# Patient Record
Sex: FEMALE | Race: WHITE | NOT HISPANIC OR LATINO | URBAN - METROPOLITAN AREA
[De-identification: names, ages, dates, MRNs, and addresses within clinical notes are randomized per-mention and may not be internally consistent; named-entity substitution may affect disease eponyms.]

---

## 2017-03-18 ENCOUNTER — INPATIENT (INPATIENT)
Facility: HOSPITAL | Age: 82
LOS: 1 days | End: 2017-03-20
Attending: INTERNAL MEDICINE | Admitting: INTERNAL MEDICINE
Payer: MEDICARE

## 2017-03-18 VITALS
SYSTOLIC BLOOD PRESSURE: 98 MMHG | TEMPERATURE: 208 F | HEIGHT: 62 IN | HEART RATE: 96 BPM | OXYGEN SATURATION: 96 % | DIASTOLIC BLOOD PRESSURE: 60 MMHG | RESPIRATION RATE: 17 BRPM | WEIGHT: 134.04 LBS

## 2017-03-18 DIAGNOSIS — S42.401A UNSPECIFIED FRACTURE OF LOWER END OF RIGHT HUMERUS, INITIAL ENCOUNTER FOR CLOSED FRACTURE: Chronic | ICD-10-CM

## 2017-03-18 DIAGNOSIS — Z96.652 PRESENCE OF LEFT ARTIFICIAL KNEE JOINT: Chronic | ICD-10-CM

## 2017-03-18 LAB
ALBUMIN SERPL ELPH-MCNC: 3.4 G/DL — SIGNIFICANT CHANGE UP (ref 3.3–5)
ALP SERPL-CCNC: 97 U/L — SIGNIFICANT CHANGE UP (ref 40–120)
ALT FLD-CCNC: 40 U/L — SIGNIFICANT CHANGE UP (ref 12–78)
ANION GAP SERPL CALC-SCNC: 9 MMOL/L — SIGNIFICANT CHANGE UP (ref 5–17)
APTT BLD: 22.7 SEC — LOW (ref 27.5–37.4)
AST SERPL-CCNC: 73 U/L — HIGH (ref 15–37)
BASOPHILS # BLD AUTO: 0.1 K/UL — SIGNIFICANT CHANGE UP (ref 0–0.2)
BASOPHILS NFR BLD AUTO: 0.4 % — SIGNIFICANT CHANGE UP (ref 0–2)
BILIRUB SERPL-MCNC: 1.6 MG/DL — HIGH (ref 0.2–1.2)
BUN SERPL-MCNC: 18 MG/DL — SIGNIFICANT CHANGE UP (ref 7–23)
CALCIUM SERPL-MCNC: 8.7 MG/DL — SIGNIFICANT CHANGE UP (ref 8.5–10.1)
CHLORIDE SERPL-SCNC: 103 MMOL/L — SIGNIFICANT CHANGE UP (ref 96–108)
CK MB BLD-MCNC: 1.3 % — SIGNIFICANT CHANGE UP (ref 0–3.5)
CK MB CFR SERPL CALC: 2.5 NG/ML — SIGNIFICANT CHANGE UP (ref 0.5–3.6)
CK SERPL-CCNC: 196 U/L — HIGH (ref 26–192)
CO2 SERPL-SCNC: 27 MMOL/L — SIGNIFICANT CHANGE UP (ref 22–31)
CREAT SERPL-MCNC: 1.2 MG/DL — SIGNIFICANT CHANGE UP (ref 0.5–1.3)
EOSINOPHIL # BLD AUTO: 0 K/UL — SIGNIFICANT CHANGE UP (ref 0–0.5)
EOSINOPHIL NFR BLD AUTO: 0.2 % — SIGNIFICANT CHANGE UP (ref 0–6)
GLUCOSE SERPL-MCNC: 149 MG/DL — HIGH (ref 70–99)
HCT VFR BLD CALC: 46.1 % — HIGH (ref 34.5–45)
HGB BLD-MCNC: 16.1 G/DL — HIGH (ref 11.5–15.5)
INR BLD: 1.09 RATIO — SIGNIFICANT CHANGE UP (ref 0.88–1.16)
LACTATE SERPL-SCNC: 3.4 MMOL/L — HIGH (ref 0.7–2)
LYMPHOCYTES # BLD AUTO: 0.4 K/UL — LOW (ref 1–3.3)
LYMPHOCYTES # BLD AUTO: 3.2 % — LOW (ref 13–44)
MCHC RBC-ENTMCNC: 30.2 PG — SIGNIFICANT CHANGE UP (ref 27–34)
MCHC RBC-ENTMCNC: 35 GM/DL — SIGNIFICANT CHANGE UP (ref 32–36)
MCV RBC AUTO: 86.5 FL — SIGNIFICANT CHANGE UP (ref 80–100)
MONOCYTES # BLD AUTO: 0.1 K/UL — SIGNIFICANT CHANGE UP (ref 0–0.9)
MONOCYTES NFR BLD AUTO: 0.6 % — LOW (ref 2–14)
NEUTROPHILS # BLD AUTO: 12.6 K/UL — HIGH (ref 1.8–7.4)
NEUTROPHILS NFR BLD AUTO: 95.6 % — HIGH (ref 43–77)
PLATELET # BLD AUTO: 330 K/UL — SIGNIFICANT CHANGE UP (ref 150–400)
POTASSIUM SERPL-MCNC: 5.2 MMOL/L — SIGNIFICANT CHANGE UP (ref 3.5–5.3)
POTASSIUM SERPL-SCNC: 5.2 MMOL/L — SIGNIFICANT CHANGE UP (ref 3.5–5.3)
PROT SERPL-MCNC: 7.2 GM/DL — SIGNIFICANT CHANGE UP (ref 6–8.3)
PROTHROM AB SERPL-ACNC: 12.2 SEC — SIGNIFICANT CHANGE UP (ref 10–13.1)
RBC # BLD: 5.33 M/UL — HIGH (ref 3.8–5.2)
RBC # FLD: 15.7 % — HIGH (ref 11–15)
SODIUM SERPL-SCNC: 139 MMOL/L — SIGNIFICANT CHANGE UP (ref 135–145)
WBC # BLD: 13.2 K/UL — HIGH (ref 3.8–10.5)
WBC # FLD AUTO: 13.2 K/UL — HIGH (ref 3.8–10.5)

## 2017-03-18 PROCEDURE — 99285 EMERGENCY DEPT VISIT HI MDM: CPT

## 2017-03-18 RX ORDER — SODIUM CHLORIDE 9 MG/ML
1000 INJECTION INTRAMUSCULAR; INTRAVENOUS; SUBCUTANEOUS ONCE
Qty: 0 | Refills: 0 | Status: COMPLETED | OUTPATIENT
Start: 2017-03-18 | End: 2017-03-18

## 2017-03-18 RX ORDER — ONDANSETRON 8 MG/1
4 TABLET, FILM COATED ORAL ONCE
Qty: 0 | Refills: 0 | Status: COMPLETED | OUTPATIENT
Start: 2017-03-18 | End: 2017-03-18

## 2017-03-18 RX ORDER — MORPHINE SULFATE 50 MG/1
2 CAPSULE, EXTENDED RELEASE ORAL ONCE
Qty: 0 | Refills: 0 | Status: DISCONTINUED | OUTPATIENT
Start: 2017-03-18 | End: 2017-03-18

## 2017-03-18 RX ORDER — PIPERACILLIN AND TAZOBACTAM 4; .5 G/20ML; G/20ML
3.38 INJECTION, POWDER, LYOPHILIZED, FOR SOLUTION INTRAVENOUS ONCE
Qty: 0 | Refills: 0 | Status: COMPLETED | OUTPATIENT
Start: 2017-03-18 | End: 2017-03-18

## 2017-03-18 RX ADMIN — PIPERACILLIN AND TAZOBACTAM 200 GRAM(S): 4; .5 INJECTION, POWDER, LYOPHILIZED, FOR SOLUTION INTRAVENOUS at 23:39

## 2017-03-18 RX ADMIN — MORPHINE SULFATE 2 MILLIGRAM(S): 50 CAPSULE, EXTENDED RELEASE ORAL at 23:18

## 2017-03-18 RX ADMIN — ONDANSETRON 4 MILLIGRAM(S): 8 TABLET, FILM COATED ORAL at 23:28

## 2017-03-18 RX ADMIN — SODIUM CHLORIDE 500 MILLILITER(S): 9 INJECTION INTRAMUSCULAR; INTRAVENOUS; SUBCUTANEOUS at 23:28

## 2017-03-18 RX ADMIN — MORPHINE SULFATE 2 MILLIGRAM(S): 50 CAPSULE, EXTENDED RELEASE ORAL at 23:39

## 2017-03-18 NOTE — ED ADULT NURSE NOTE - PMH
Anxiety    Cold agglutinin disease    Colitis  microscopic  diagnosed colonoscopy   Depression    DVT (Deep Venous Thrombosis)  left leg 2001 last coumadin 2009  Fracture  right proximal ulna and right radial head  Glaucoma  s/p laser last treated 2001  Hemolytic Anemia  diagnosed 2001 transfused 3 years ago  Joint Replacement  right hip with hardware s/p fracture  Olecranon fracture  left -4/8/16  Osteopenia    Overactive Bladder    Raynauds Phenomenon    Varicosities  b/l legs  Vasculitis  lungs

## 2017-03-18 NOTE — ED ADULT NURSE NOTE - OBJECTIVE STATEMENT
pt is AxOx4; c/o abd pain which started in the middle of the day. c/o n/v x multiple episodes. pt feels dizzy and weak

## 2017-03-18 NOTE — ED ADULT TRIAGE NOTE - CHIEF COMPLAINT QUOTE
" Around 2 o'clock I started to have pain around my stomach and I have been throwing since, and I feel very dizzy"

## 2017-03-18 NOTE — ED PROVIDER NOTE - CARE PLAN
Principal Discharge DX:	NSTEMI (non-ST elevated myocardial infarction)  Secondary Diagnosis:	Ureteral colic Principal Discharge DX:	NSTEMI (non-ST elevated myocardial infarction)  Secondary Diagnosis:	Ureteral colic  Secondary Diagnosis:	Sepsis

## 2017-03-18 NOTE — ED PROVIDER NOTE - PHYSICAL EXAMINATION
Gen: Alert, +rigors;  Head: NC, AT, EOMI, normal lids/conjunctiva;  ENT: normal hearing, patent oropharynx, MMM;  Neck: supple, no tenderness/meningismus, FROM;  Pulm: Bilateral clear BS, normal resp effort, no wheeze/stridor/retractions;  CV: RRR, no M/R/G, +dist pulses;  Abd: soft, epigastric & RUQ TTP, ND, +BS, +voluntary guarding;  Mskel: no edema/erythema/cyanosis;  Skin: no rash;  Neuro: AAOx3, no sensory/motor deficits

## 2017-03-18 NOTE — ED PROVIDER NOTE - OBJECTIVE STATEMENT
Pertinent PMH/PSH/FHx/SHx and Review of Systems contained within:    82yo F w PMH of cold agglutinin hemolytic anemia, vasculitis, anxiety, Raynaud's, depression, DVT, glaucoma, s/p hip replacement & partial knee replacement presents to ED for eval of abd pain & vomiting.  Pt states she was in her usual state of health until about 2pm, after lunch of fruits, cottage cheese & yogurt, when she felt mid to upper abd pain, radiating to R flank.  Pt then had mult episode of NB/NB vomiting.  Denies diarrhea, ingestion of contaminated foods.  Pt also c/o fever, chills & weakness.    +fever/chills, No photophobia/eye pain/changes in vision, No ear pain/sore throat/dysphagia, No chest pain/palpitations, no SOB/cough/wheeze/stridor, +abdominal pain, No neck/back pain, no rash, no changes in neurological status/function.

## 2017-03-19 ENCOUNTER — TRANSCRIPTION ENCOUNTER (OUTPATIENT)
Age: 82
End: 2017-03-19

## 2017-03-19 DIAGNOSIS — N23 UNSPECIFIED RENAL COLIC: ICD-10-CM

## 2017-03-19 DIAGNOSIS — A41.9 SEPSIS, UNSPECIFIED ORGANISM: ICD-10-CM

## 2017-03-19 DIAGNOSIS — I21.4 NON-ST ELEVATION (NSTEMI) MYOCARDIAL INFARCTION: ICD-10-CM

## 2017-03-19 DIAGNOSIS — N13.2 HYDRONEPHROSIS WITH RENAL AND URETERAL CALCULOUS OBSTRUCTION: ICD-10-CM

## 2017-03-19 DIAGNOSIS — N13.30 UNSPECIFIED HYDRONEPHROSIS: ICD-10-CM

## 2017-03-19 LAB
ANION GAP SERPL CALC-SCNC: 26 MMOL/L — HIGH (ref 5–17)
ANION GAP SERPL CALC-SCNC: 28 MMOL/L — HIGH (ref 5–17)
APPEARANCE UR: ABNORMAL
BACTERIA # UR AUTO: ABNORMAL
BASE EXCESS BLDA CALC-SCNC: -21.1 MMOL/L — SIGNIFICANT CHANGE UP (ref -2–2)
BASE EXCESS BLDA CALC-SCNC: -22.4 MMOL/L — LOW (ref -2–2)
BASE EXCESS BLDA CALC-SCNC: -24.2 MMOL/L — LOW (ref -2–2)
BASE EXCESS BLDA CALC-SCNC: -24.9 MMOL/L — LOW (ref -2–2)
BILIRUB UR-MCNC: NEGATIVE — SIGNIFICANT CHANGE UP
BLD GP AB SCN SERPL QL: SIGNIFICANT CHANGE UP
BLOOD GAS COMMENTS: SIGNIFICANT CHANGE UP
BLOOD GAS SOURCE: SIGNIFICANT CHANGE UP
BUN SERPL-MCNC: 27 MG/DL — HIGH (ref 7–23)
BUN SERPL-MCNC: 27 MG/DL — HIGH (ref 7–23)
CALCIUM SERPL-MCNC: 6 MG/DL — CRITICAL LOW (ref 8.5–10.1)
CALCIUM SERPL-MCNC: 6.4 MG/DL — CRITICAL LOW (ref 8.5–10.1)
CHLORIDE SERPL-SCNC: 112 MMOL/L — HIGH (ref 96–108)
CHLORIDE SERPL-SCNC: 114 MMOL/L — HIGH (ref 96–108)
CK SERPL-CCNC: 734 U/L — HIGH (ref 26–192)
CO2 SERPL-SCNC: 7 MMOL/L — CRITICAL LOW (ref 22–31)
CO2 SERPL-SCNC: 8 MMOL/L — CRITICAL LOW (ref 22–31)
COLOR SPEC: YELLOW — SIGNIFICANT CHANGE UP
CREAT SERPL-MCNC: 2.72 MG/DL — HIGH (ref 0.5–1.3)
CREAT SERPL-MCNC: 2.83 MG/DL — HIGH (ref 0.5–1.3)
DIFF PNL FLD: ABNORMAL
EPI CELLS # UR: SIGNIFICANT CHANGE UP
GLUCOSE SERPL-MCNC: 169 MG/DL — HIGH (ref 70–99)
GLUCOSE SERPL-MCNC: <1 MG/DL — CRITICAL LOW (ref 70–99)
GLUCOSE UR QL: NEGATIVE MG/DL — SIGNIFICANT CHANGE UP
GRAM STN FLD: SIGNIFICANT CHANGE UP
GRAM STN FLD: SIGNIFICANT CHANGE UP
HCO3 BLDA-SCNC: 10 MMOL/L — SIGNIFICANT CHANGE UP (ref 21–29)
HCO3 BLDA-SCNC: 5 MMOL/L — LOW (ref 21–29)
HCO3 BLDA-SCNC: 7 MMOL/L — LOW (ref 21–29)
HCO3 BLDA-SCNC: 9 MMOL/L — LOW (ref 21–29)
HCT VFR BLD CALC: 31.6 % — LOW (ref 34.5–45)
HCT VFR BLD CALC: 38.8 % — SIGNIFICANT CHANGE UP (ref 34.5–45)
HGB BLD-MCNC: 10.5 G/DL — LOW (ref 11.5–15.5)
HGB BLD-MCNC: 13 G/DL — SIGNIFICANT CHANGE UP (ref 11.5–15.5)
HOROWITZ INDEX BLDA+IHG-RTO: 100 — SIGNIFICANT CHANGE UP
HOROWITZ INDEX BLDA+IHG-RTO: 60 — SIGNIFICANT CHANGE UP
KETONES UR-MCNC: NEGATIVE — SIGNIFICANT CHANGE UP
LACTATE SERPL-SCNC: 12.1 MMOL/L — CRITICAL HIGH (ref 0.7–2)
LACTATE SERPL-SCNC: 15.7 MMOL/L — CRITICAL HIGH (ref 0.7–2)
LACTATE SERPL-SCNC: 3.4 MMOL/L — HIGH (ref 0.7–2)
LACTATE SERPL-SCNC: 5.8 MMOL/L — CRITICAL HIGH (ref 0.7–2)
LEUKOCYTE ESTERASE UR-ACNC: ABNORMAL
LIDOCAIN IGE QN: 84 U/L — SIGNIFICANT CHANGE UP (ref 73–393)
MAGNESIUM SERPL-MCNC: 1.8 MG/DL — SIGNIFICANT CHANGE UP (ref 1.8–2.4)
MCHC RBC-ENTMCNC: 30.2 PG — SIGNIFICANT CHANGE UP (ref 27–34)
MCHC RBC-ENTMCNC: 30.4 PG — SIGNIFICANT CHANGE UP (ref 27–34)
MCHC RBC-ENTMCNC: 33.2 GM/DL — SIGNIFICANT CHANGE UP (ref 32–36)
MCHC RBC-ENTMCNC: 33.5 GM/DL — SIGNIFICANT CHANGE UP (ref 32–36)
MCV RBC AUTO: 90.2 FL — SIGNIFICANT CHANGE UP (ref 80–100)
MCV RBC AUTO: 91.6 FL — SIGNIFICANT CHANGE UP (ref 80–100)
NITRITE UR-MCNC: POSITIVE
PCO2 BLDA: 21 MMHG — LOW (ref 32–46)
PCO2 BLDA: 30 MMHG — LOW (ref 32–46)
PCO2 BLDA: 37 MMHG — SIGNIFICANT CHANGE UP (ref 32–46)
PCO2 BLDA: 40 MMHG — SIGNIFICANT CHANGE UP (ref 32–46)
PH BLD: 7 — CRITICAL LOW (ref 7.35–7.45)
PH BLD: 7.01 — SIGNIFICANT CHANGE UP (ref 7.35–7.45)
PH UR: 8 — SIGNIFICANT CHANGE UP (ref 4.8–8)
PHOSPHATE SERPL-MCNC: 6.9 MG/DL — HIGH (ref 2.5–4.5)
PLATELET # BLD AUTO: 112 K/UL — LOW (ref 150–400)
PLATELET # BLD AUTO: 141 K/UL — LOW (ref 150–400)
PO2 BLDA: 102 MMHG — SIGNIFICANT CHANGE UP (ref 74–108)
PO2 BLDA: 105 MMHG — SIGNIFICANT CHANGE UP (ref 74–108)
PO2 BLDA: 142 MMHG — HIGH (ref 74–108)
PO2 BLDA: 241 MMHG — SIGNIFICANT CHANGE UP (ref 74–108)
POTASSIUM SERPL-MCNC: 4.9 MMOL/L — SIGNIFICANT CHANGE UP (ref 3.5–5.3)
POTASSIUM SERPL-MCNC: 5.2 MMOL/L — SIGNIFICANT CHANGE UP (ref 3.5–5.3)
POTASSIUM SERPL-SCNC: 4.9 MMOL/L — SIGNIFICANT CHANGE UP (ref 3.5–5.3)
POTASSIUM SERPL-SCNC: 5.2 MMOL/L — SIGNIFICANT CHANGE UP (ref 3.5–5.3)
PROT UR-MCNC: 100 MG/DL
RBC # BLD: 3.45 M/UL — LOW (ref 3.8–5.2)
RBC # BLD: 4.31 M/UL — SIGNIFICANT CHANGE UP (ref 3.8–5.2)
RBC # FLD: 16.4 % — HIGH (ref 11–15)
RBC # FLD: 16.9 % — HIGH (ref 11–15)
RBC CASTS # UR COMP ASSIST: ABNORMAL /HPF (ref 0–4)
SAO2 % BLDA: 94 % — SIGNIFICANT CHANGE UP (ref 92–96)
SAO2 % BLDA: 95 % — SIGNIFICANT CHANGE UP (ref 92–96)
SAO2 % BLDA: 96 % — SIGNIFICANT CHANGE UP (ref 92–96)
SAO2 % BLDA: 99 % — SIGNIFICANT CHANGE UP (ref 92–96)
SODIUM SERPL-SCNC: 147 MMOL/L — HIGH (ref 135–145)
SODIUM SERPL-SCNC: 148 MMOL/L — HIGH (ref 135–145)
SP GR SPEC: 1.01 — SIGNIFICANT CHANGE UP (ref 1.01–1.02)
SPECIMEN SOURCE: SIGNIFICANT CHANGE UP
SPECIMEN SOURCE: SIGNIFICANT CHANGE UP
TROPONIN I SERPL-MCNC: 0.32 NG/ML — HIGH (ref 0.01–0.04)
TROPONIN I SERPL-MCNC: 0.56 NG/ML — HIGH (ref 0.01–0.04)
TROPONIN I SERPL-MCNC: 18.3 NG/ML — HIGH (ref 0.01–0.04)
UROBILINOGEN FLD QL: NEGATIVE MG/DL — SIGNIFICANT CHANGE UP
WBC # BLD: 39.3 K/UL — HIGH (ref 3.8–10.5)
WBC # BLD: 41.2 K/UL — CRITICAL HIGH (ref 3.8–10.5)
WBC # FLD AUTO: 39.3 K/UL — HIGH (ref 3.8–10.5)
WBC # FLD AUTO: 41.2 K/UL — CRITICAL HIGH (ref 3.8–10.5)
WBC UR QL: >50

## 2017-03-19 PROCEDURE — 99291 CRITICAL CARE FIRST HOUR: CPT | Mod: 25

## 2017-03-19 PROCEDURE — 76700 US EXAM ABDOM COMPLETE: CPT | Mod: 26

## 2017-03-19 PROCEDURE — 74177 CT ABD & PELVIS W/CONTRAST: CPT | Mod: 26

## 2017-03-19 PROCEDURE — 71010: CPT | Mod: 26,77

## 2017-03-19 PROCEDURE — 71010: CPT | Mod: 26

## 2017-03-19 PROCEDURE — 36556 INSERT NON-TUNNEL CV CATH: CPT

## 2017-03-19 PROCEDURE — 76000 FLUOROSCOPY <1 HR PHYS/QHP: CPT | Mod: 26

## 2017-03-19 RX ORDER — FENTANYL CITRATE 50 UG/ML
40 INJECTION INTRAVENOUS EVERY 4 HOURS
Qty: 0 | Refills: 0 | Status: DISCONTINUED | OUTPATIENT
Start: 2017-03-19 | End: 2017-03-20

## 2017-03-19 RX ORDER — ONDANSETRON 8 MG/1
4 TABLET, FILM COATED ORAL ONCE
Qty: 0 | Refills: 0 | Status: COMPLETED | OUTPATIENT
Start: 2017-03-19 | End: 2017-03-19

## 2017-03-19 RX ORDER — HEPARIN SODIUM 5000 [USP'U]/ML
5000 INJECTION INTRAVENOUS; SUBCUTANEOUS EVERY 8 HOURS
Qty: 0 | Refills: 0 | Status: DISCONTINUED | OUTPATIENT
Start: 2017-03-19 | End: 2017-03-19

## 2017-03-19 RX ORDER — DEXTROSE 10 % IN WATER 10 %
1000 INTRAVENOUS SOLUTION INTRAVENOUS
Qty: 0 | Refills: 0 | Status: DISCONTINUED | OUTPATIENT
Start: 2017-03-19 | End: 2017-03-20

## 2017-03-19 RX ORDER — AMIKACIN SULFATE 250 MG/ML
330 INJECTION, SOLUTION INTRAMUSCULAR; INTRAVENOUS EVERY 8 HOURS
Qty: 0 | Refills: 0 | Status: DISCONTINUED | OUTPATIENT
Start: 2017-03-19 | End: 2017-03-20

## 2017-03-19 RX ORDER — HEPARIN SODIUM 5000 [USP'U]/ML
3800 INJECTION INTRAVENOUS; SUBCUTANEOUS EVERY 6 HOURS
Qty: 0 | Refills: 0 | Status: DISCONTINUED | OUTPATIENT
Start: 2017-03-19 | End: 2017-03-19

## 2017-03-19 RX ORDER — HEPARIN SODIUM 5000 [USP'U]/ML
4100 INJECTION INTRAVENOUS; SUBCUTANEOUS EVERY 6 HOURS
Qty: 0 | Refills: 0 | Status: DISCONTINUED | OUTPATIENT
Start: 2017-03-19 | End: 2017-03-20

## 2017-03-19 RX ORDER — PIPERACILLIN AND TAZOBACTAM 4; .5 G/20ML; G/20ML
3.38 INJECTION, POWDER, LYOPHILIZED, FOR SOLUTION INTRAVENOUS ONCE
Qty: 0 | Refills: 0 | Status: COMPLETED | OUTPATIENT
Start: 2017-03-19 | End: 2017-03-19

## 2017-03-19 RX ORDER — AMIKACIN SULFATE 250 MG/ML
INJECTION, SOLUTION INTRAMUSCULAR; INTRAVENOUS
Qty: 0 | Refills: 0 | Status: DISCONTINUED | OUTPATIENT
Start: 2017-03-19 | End: 2017-03-20

## 2017-03-19 RX ORDER — CALCIUM GLUCONATE 100 MG/ML
2 VIAL (ML) INTRAVENOUS ONCE
Qty: 0 | Refills: 0 | Status: COMPLETED | OUTPATIENT
Start: 2017-03-19 | End: 2017-03-19

## 2017-03-19 RX ORDER — SODIUM BICARBONATE 1 MEQ/ML
50 SYRINGE (ML) INTRAVENOUS
Qty: 0 | Refills: 0 | Status: COMPLETED | OUTPATIENT
Start: 2017-03-19 | End: 2017-03-19

## 2017-03-19 RX ORDER — SODIUM CHLORIDE 9 MG/ML
1000 INJECTION, SOLUTION INTRAVENOUS
Qty: 0 | Refills: 0 | Status: DISCONTINUED | OUTPATIENT
Start: 2017-03-19 | End: 2017-03-19

## 2017-03-19 RX ORDER — CHLORHEXIDINE GLUCONATE 213 G/1000ML
1 SOLUTION TOPICAL DAILY
Qty: 0 | Refills: 0 | Status: DISCONTINUED | OUTPATIENT
Start: 2017-03-19 | End: 2017-03-20

## 2017-03-19 RX ORDER — CHLORHEXIDINE GLUCONATE 213 G/1000ML
1 SOLUTION TOPICAL DAILY
Qty: 0 | Refills: 0 | Status: DISCONTINUED | OUTPATIENT
Start: 2017-03-19 | End: 2017-03-19

## 2017-03-19 RX ORDER — PROPOFOL 10 MG/ML
10 INJECTION, EMULSION INTRAVENOUS
Qty: 1000 | Refills: 0 | Status: DISCONTINUED | OUTPATIENT
Start: 2017-03-19 | End: 2017-03-20

## 2017-03-19 RX ORDER — PIPERACILLIN AND TAZOBACTAM 4; .5 G/20ML; G/20ML
3.38 INJECTION, POWDER, LYOPHILIZED, FOR SOLUTION INTRAVENOUS EVERY 8 HOURS
Qty: 0 | Refills: 0 | Status: DISCONTINUED | OUTPATIENT
Start: 2017-03-19 | End: 2017-03-20

## 2017-03-19 RX ORDER — FENTANYL CITRATE 50 UG/ML
50 INJECTION INTRAVENOUS ONCE
Qty: 0 | Refills: 0 | Status: DISCONTINUED | OUTPATIENT
Start: 2017-03-19 | End: 2017-03-19

## 2017-03-19 RX ORDER — SODIUM CHLORIDE 9 MG/ML
1000 INJECTION INTRAMUSCULAR; INTRAVENOUS; SUBCUTANEOUS ONCE
Qty: 0 | Refills: 0 | Status: COMPLETED | OUTPATIENT
Start: 2017-03-19 | End: 2017-03-19

## 2017-03-19 RX ORDER — SODIUM BICARBONATE 1 MEQ/ML
0.34 SYRINGE (ML) INTRAVENOUS
Qty: 150 | Refills: 0 | Status: DISCONTINUED | OUTPATIENT
Start: 2017-03-19 | End: 2017-03-20

## 2017-03-19 RX ORDER — SODIUM BICARBONATE 1 MEQ/ML
100 SYRINGE (ML) INTRAVENOUS ONCE
Qty: 0 | Refills: 0 | Status: COMPLETED | OUTPATIENT
Start: 2017-03-19 | End: 2017-03-19

## 2017-03-19 RX ORDER — DEXTROSE 50 % IN WATER 50 %
100 SYRINGE (ML) INTRAVENOUS ONCE
Qty: 0 | Refills: 0 | Status: COMPLETED | OUTPATIENT
Start: 2017-03-19 | End: 2017-03-19

## 2017-03-19 RX ORDER — ASPIRIN/CALCIUM CARB/MAGNESIUM 324 MG
81 TABLET ORAL DAILY
Qty: 0 | Refills: 0 | Status: DISCONTINUED | OUTPATIENT
Start: 2017-03-20 | End: 2017-03-20

## 2017-03-19 RX ORDER — CHLORHEXIDINE GLUCONATE 213 G/1000ML
15 SOLUTION TOPICAL
Qty: 0 | Refills: 0 | Status: DISCONTINUED | OUTPATIENT
Start: 2017-03-19 | End: 2017-03-20

## 2017-03-19 RX ORDER — SODIUM CHLORIDE 9 MG/ML
2000 INJECTION INTRAMUSCULAR; INTRAVENOUS; SUBCUTANEOUS ONCE
Qty: 0 | Refills: 0 | Status: COMPLETED | OUTPATIENT
Start: 2017-03-19 | End: 2017-03-19

## 2017-03-19 RX ORDER — NOREPINEPHRINE BITARTRATE/D5W 8 MG/250ML
0.01 PLASTIC BAG, INJECTION (ML) INTRAVENOUS
Qty: 16 | Refills: 0 | Status: DISCONTINUED | OUTPATIENT
Start: 2017-03-19 | End: 2017-03-20

## 2017-03-19 RX ORDER — NOREPINEPHRINE BITARTRATE/D5W 8 MG/250ML
0.5 PLASTIC BAG, INJECTION (ML) INTRAVENOUS
Qty: 8 | Refills: 0 | Status: DISCONTINUED | OUTPATIENT
Start: 2017-03-19 | End: 2017-03-19

## 2017-03-19 RX ORDER — ACETAMINOPHEN 500 MG
975 TABLET ORAL ONCE
Qty: 0 | Refills: 0 | Status: COMPLETED | OUTPATIENT
Start: 2017-03-19 | End: 2017-03-19

## 2017-03-19 RX ORDER — PANTOPRAZOLE SODIUM 20 MG/1
40 TABLET, DELAYED RELEASE ORAL DAILY
Qty: 0 | Refills: 0 | Status: DISCONTINUED | OUTPATIENT
Start: 2017-03-19 | End: 2017-03-20

## 2017-03-19 RX ORDER — HEPARIN SODIUM 5000 [USP'U]/ML
INJECTION INTRAVENOUS; SUBCUTANEOUS
Qty: 25000 | Refills: 0 | Status: DISCONTINUED | OUTPATIENT
Start: 2017-03-19 | End: 2017-03-20

## 2017-03-19 RX ORDER — ASPIRIN/CALCIUM CARB/MAGNESIUM 324 MG
325 TABLET ORAL ONCE
Qty: 0 | Refills: 0 | Status: COMPLETED | OUTPATIENT
Start: 2017-03-19 | End: 2017-03-19

## 2017-03-19 RX ORDER — PIPERACILLIN AND TAZOBACTAM 4; .5 G/20ML; G/20ML
3.38 INJECTION, POWDER, LYOPHILIZED, FOR SOLUTION INTRAVENOUS EVERY 8 HOURS
Qty: 0 | Refills: 0 | Status: DISCONTINUED | OUTPATIENT
Start: 2017-03-19 | End: 2017-03-19

## 2017-03-19 RX ORDER — AMIKACIN SULFATE 250 MG/ML
330 INJECTION, SOLUTION INTRAMUSCULAR; INTRAVENOUS ONCE
Qty: 0 | Refills: 0 | Status: COMPLETED | OUTPATIENT
Start: 2017-03-19 | End: 2017-03-19

## 2017-03-19 RX ORDER — CALCIUM GLUCONATE 100 MG/ML
1 VIAL (ML) INTRAVENOUS ONCE
Qty: 0 | Refills: 0 | Status: COMPLETED | OUTPATIENT
Start: 2017-03-19 | End: 2017-03-19

## 2017-03-19 RX ORDER — HEPARIN SODIUM 5000 [USP'U]/ML
3800 INJECTION INTRAVENOUS; SUBCUTANEOUS ONCE
Qty: 0 | Refills: 0 | Status: COMPLETED | OUTPATIENT
Start: 2017-03-19 | End: 2017-03-19

## 2017-03-19 RX ORDER — HEPARIN SODIUM 5000 [USP'U]/ML
INJECTION INTRAVENOUS; SUBCUTANEOUS
Qty: 25000 | Refills: 0 | Status: DISCONTINUED | OUTPATIENT
Start: 2017-03-19 | End: 2017-03-19

## 2017-03-19 RX ORDER — ONDANSETRON 8 MG/1
4 TABLET, FILM COATED ORAL EVERY 8 HOURS
Qty: 0 | Refills: 0 | Status: DISCONTINUED | OUTPATIENT
Start: 2017-03-19 | End: 2017-03-20

## 2017-03-19 RX ADMIN — Medication 150 MEQ/KG/HR: at 19:00

## 2017-03-19 RX ADMIN — SODIUM CHLORIDE 1000 MILLILITER(S): 9 INJECTION INTRAMUSCULAR; INTRAVENOUS; SUBCUTANEOUS at 05:53

## 2017-03-19 RX ADMIN — CHLORHEXIDINE GLUCONATE 15 MILLILITER(S): 213 SOLUTION TOPICAL at 17:21

## 2017-03-19 RX ADMIN — ONDANSETRON 4 MILLIGRAM(S): 8 TABLET, FILM COATED ORAL at 08:14

## 2017-03-19 RX ADMIN — Medication 57 MICROGRAM(S)/KG/MIN: at 18:45

## 2017-03-19 RX ADMIN — SODIUM CHLORIDE 1000 MILLILITER(S): 9 INJECTION INTRAMUSCULAR; INTRAVENOUS; SUBCUTANEOUS at 07:19

## 2017-03-19 RX ADMIN — FENTANYL CITRATE 50 MICROGRAM(S): 50 INJECTION INTRAVENOUS at 11:12

## 2017-03-19 RX ADMIN — Medication 100 MILLILITER(S): at 17:00

## 2017-03-19 RX ADMIN — SODIUM CHLORIDE 2000 MILLILITER(S): 9 INJECTION INTRAMUSCULAR; INTRAVENOUS; SUBCUTANEOUS at 09:07

## 2017-03-19 RX ADMIN — Medication 50 MILLIEQUIVALENT(S): at 22:00

## 2017-03-19 RX ADMIN — Medication 200 GRAM(S): at 19:35

## 2017-03-19 RX ADMIN — FENTANYL CITRATE 50 MICROGRAM(S): 50 INJECTION INTRAVENOUS at 09:39

## 2017-03-19 RX ADMIN — AMIKACIN SULFATE 101.32 MILLIGRAM(S): 250 INJECTION, SOLUTION INTRAMUSCULAR; INTRAVENOUS at 17:12

## 2017-03-19 RX ADMIN — HEPARIN SODIUM 800 UNIT(S)/HR: 5000 INJECTION INTRAVENOUS; SUBCUTANEOUS at 23:04

## 2017-03-19 RX ADMIN — HEPARIN SODIUM 3800 UNIT(S): 5000 INJECTION INTRAVENOUS; SUBCUTANEOUS at 06:44

## 2017-03-19 RX ADMIN — Medication 57 MICROGRAM(S)/KG/MIN: at 09:06

## 2017-03-19 RX ADMIN — HEPARIN SODIUM 4100 UNIT(S): 5000 INJECTION INTRAVENOUS; SUBCUTANEOUS at 23:08

## 2017-03-19 RX ADMIN — Medication 50 MILLIEQUIVALENT(S): at 22:05

## 2017-03-19 RX ADMIN — FENTANYL CITRATE 50 MICROGRAM(S): 50 INJECTION INTRAVENOUS at 11:30

## 2017-03-19 RX ADMIN — PANTOPRAZOLE SODIUM 40 MILLIGRAM(S): 20 TABLET, DELAYED RELEASE ORAL at 17:21

## 2017-03-19 RX ADMIN — PROPOFOL 3.93 MICROGRAM(S)/KG/MIN: 10 INJECTION, EMULSION INTRAVENOUS at 18:47

## 2017-03-19 RX ADMIN — PIPERACILLIN AND TAZOBACTAM 200 GRAM(S): 4; .5 INJECTION, POWDER, LYOPHILIZED, FOR SOLUTION INTRAVENOUS at 16:20

## 2017-03-19 RX ADMIN — Medication 975 MILLIGRAM(S): at 06:22

## 2017-03-19 RX ADMIN — Medication 100 MILLILITER(S): at 17:37

## 2017-03-19 RX ADMIN — ONDANSETRON 4 MILLIGRAM(S): 8 TABLET, FILM COATED ORAL at 11:17

## 2017-03-19 RX ADMIN — Medication 325 MILLIGRAM(S): at 06:19

## 2017-03-19 RX ADMIN — FENTANYL CITRATE 50 MICROGRAM(S): 50 INJECTION INTRAVENOUS at 10:03

## 2017-03-19 RX ADMIN — ONDANSETRON 4 MILLIGRAM(S): 8 TABLET, FILM COATED ORAL at 06:19

## 2017-03-19 RX ADMIN — Medication 100 MILLILITER(S): at 18:00

## 2017-03-19 RX ADMIN — HEPARIN SODIUM 750 UNIT(S)/HR: 5000 INJECTION INTRAVENOUS; SUBCUTANEOUS at 07:04

## 2017-03-19 RX ADMIN — Medication 0.61 MICROGRAM(S)/KG/MIN: at 22:28

## 2017-03-19 RX ADMIN — CHLORHEXIDINE GLUCONATE 1 APPLICATION(S): 213 SOLUTION TOPICAL at 17:21

## 2017-03-19 RX ADMIN — SODIUM CHLORIDE 1000 MILLILITER(S): 9 INJECTION INTRAMUSCULAR; INTRAVENOUS; SUBCUTANEOUS at 16:15

## 2017-03-19 RX ADMIN — Medication 200 GRAM(S): at 22:28

## 2017-03-19 RX ADMIN — Medication 100 MILLILITER(S): at 19:52

## 2017-03-19 RX ADMIN — Medication 100 MILLIEQUIVALENT(S): at 17:00

## 2017-03-19 RX ADMIN — PIPERACILLIN AND TAZOBACTAM 25 GRAM(S): 4; .5 INJECTION, POWDER, LYOPHILIZED, FOR SOLUTION INTRAVENOUS at 22:29

## 2017-03-19 NOTE — H&P ADULT. - PROBLEM SELECTOR PLAN 2
-US and CT abdomen: with moderate hydronephrosis due to renal stone.  -Urology contacted: Dr Sharma will come in to do urgent decompression/cystoscopy  -npo

## 2017-03-19 NOTE — PROCEDURE NOTE - NSINFORMCONSENT_GEN_A_CORE
Benefits, risks, and possible complications of procedure explained to patient/caregiver who verbalized understanding and gave verbal consent./taken from the daughter on the phone

## 2017-03-19 NOTE — H&P ADULT. - PROBLEM SELECTOR PLAN 1
-admit to critical care  due to obstructive urosepsis  -pt received 2L of normal saline, 3rd L going  -sepsis work up: blood cult, ua, urine cult ,and abx

## 2017-03-19 NOTE — H&P ADULT. - ATTENDING COMMENTS
Pt is an 80 yo WF with h/o cold agglutinin hemolytic anemia, vasculitis involving lungs, Raynaud’s, L leg DVT 2001 (0ff Coumadin since 2009), glaucoma, s/p left hip replacement in 2000 and left partial knee replacement in 2012 , osteopenia and anxiety presents for abdominal pain, N/V,  fevers, chills with generalized weakness. Labs with leukocytosis, (+) UA, elevated lactate 5.8 and trop 0.32->0.56. CT abdomen showing moderate R hydronephrosis with perinephric stranding and a proximal R ureteral stone. Despite being Rx'd with IVF and Abx; pt started on Levophed. Admitting dx: Septic shock 2 to hydronephrosis 2 to urinary obstruction 2  to ureteral calculus. Given pt's critical illness in setting of demand ischemia, no immediate available IR for perc nephrostomy pt was taken to the OR urgently for a necessary cystoscopy and R ureteral stent placement; this was discussed extensively between, Anest, Urology, the pt's daughter and myself    Vs as per EMR    Mouth: ETT  Lungs: CTA  Heart: Tachy  Abd: Less distended, soft, no guarding or rigidity  Ext: Cyanotic appear hands and feet/ (+) radial pulse  Neuro: Intact MS prior to the OR    a/p: 1) Septic shock 2 to hydronephrosis 2 to urinary obstruction 2  to ureteral calculus. 2) demand ischemia 3) s/p cystoscopy and R ureteral stent placement.    Resp: Check ABG and CXR post OR  ID: Cont Zosyn and give 1 dose of Aminoglycoside  CVS: Titrate Levophed to MAP > 60-65/ Cyanotic appear hands and feet in part 2 to cold agglutinin   HEME: Check CBC  FEN: IVF initially with LR  Renal: F/u as per Uro/ Follow BUN/Cr and UO  Neuro: Sedate with Propofol and Fentanyl prn  Social: Extensive discussion with pt's daughter prior to the OR    CCT 60 min

## 2017-03-19 NOTE — CONSULT NOTE ADULT - PROBLEM SELECTOR RECOMMENDATION 9
Discussed need for emergent decompression with the patient and daughter. Risks/benefits of stent versus PCN was discussed. Will proceed with immediate stent insertion.

## 2017-03-19 NOTE — H&P ADULT. - HISTORY OF PRESENT ILLNESS
Patient Patient is an 82yo F w PMH of cold agglutinin hemolytic anemia, vasculitis, anxiety, Raynaud's, depression, DVT, glaucoma, s/p hip replacement & partial knee replacement presents to ED for eval of abdominal pain & vomiting.  Pt states she was in her usual state of health until about 2pm yesterday, after lunch of fruits, cottage cheese & yogurt, when she felt mid to upper abd pain, radiating to R flank.  Pt then had multiple episodes of nausea and vomiting.  Pt also c/o fever, chills & weakness. Denies diarrhea, ingestion of contaminated foods.   In ED, patient had US done with moderate hydronephrosis, and CT abdomen with: moderate right hydronephrosis and perinephric stranding secondary to 4 mm proximal right ureteral stone. Pt became hypotensive in ED after 2L of normal saline boluses and T of 101 given tylenol IV.   Pt is being admitted to critical care for further mangement. Urology is called.

## 2017-03-19 NOTE — ED ADULT NURSE REASSESSMENT NOTE - NS ED NURSE REASSESS COMMENT FT1
AAO X3 , WARM TO TOUCH , ELEVATED REPEAT  LACTATE REPORTED BY LAB - 5.8 , NP ADEL NOTIFIED , BP 80/47,  SAT 94 ON 4L RR 26  , TEMP 103 RECTAL, AS PER NP ADEL , LEVOPHED  WILL BE ORDERED TO BE INFUSED PERIPHERALLY . IVF 0.9N.S BOLUS OVER 1 30 MINUTES INFUSING

## 2017-03-19 NOTE — PROVIDER CONTACT NOTE (EICU) - SITUATION
81F PMH cold agglutinin hemolytic anemia, vasculitis involving lungs, anxiety, Raynaud’s, left leg DVT 2001 (0ff Coumadin since 2009), glaucoma, s/p left hip replacement in 2000 and left partial knee replacement in 2012 , osteopenia presents for abdominal pain radiating to right flank associated with nausea and emesis, fevers, chills with generalized weakness. Labs with leukocytosis, (+) UA, elevated lactate 5.8 and hypotension SBP 80s despite 5L IV fluid resuscitation. CT abdomen showing moderate right hydronephrosis with perinephric stranding and a proximal right ureteral stone. Pt also with elevated troponin 0.5 Admitted to ICU for severe sepsis with septic shock on pressors secondary to urinary source with pyelonephritis due to obstructing ureteral stone. Elevated troponin may be in setting of demand ischemia from sepsis and shock.

## 2017-03-19 NOTE — ED ADULT NURSE REASSESSMENT NOTE - NS ED NURSE REASSESS COMMENT FT1
upon arrival, patient was shivering, in distress, with BP in systolic 80s, saturation high 80s. second iv line put in, bolus of fluid running, clark put in. md silverio notified. 100% non rebreather applied to patient. md silverio stated to hold heparin for now. vital signs reported to md silverio. upon arrival, patient was shivering, in distress, with BP in systolic 80s, saturation high 80s. second iv line put in, bolus of fluid running, clark put in. md silverio notified. 100% non rebreather applied to patient. md silverio stated to hold heparin for now, stop time 0740. vital signs reported to md silverio.

## 2017-03-19 NOTE — H&P ADULT. - ASSESSMENT
Patient is an 82yo F w PMH of cold agglutinin hemolytic anemia, vasculitis, anxiety, Raynaud's, depression, DVT, glaucoma, s/p hip replacement & partial knee replacement presents to ED for eval of abdominal pain & vomiting found to have obstructive moderate right hydronephrosis due to urethral stone.

## 2017-03-19 NOTE — H&P ADULT. - PSH
Elbow fracture, right  s/p ORIF  Hip Fracture  right hip s/p orif hardware 2009  History of Varicose Veins  s/p stripping and ligation of both legs 40 years ago  S/P Carpal Tunnel Release  right 2008  S/P Cataract Extraction  right 2010  S/P Colonoscopy  10/2015 microscopic colitis  S/P Dilatation and Curettage  topa x1  S/P Dilatation and Curettage  missed ab x1  S/P Hip Replacement  2000 - right  S/P Laminectomy with Spinal Fusion  lumbar   S/P Tooth Extraction  dental implant lower  2000  Status post left partial knee replacement  2012

## 2017-03-19 NOTE — H&P ADULT. - PROBLEM SELECTOR PLAN 4
- pt with elevated troponins, neg EKG  -will hold off on heparin drip for  now.  -re-access, ekg and echo

## 2017-03-19 NOTE — CONSULT NOTE ADULT - SUBJECTIVE AND OBJECTIVE BOX
HPI:  Patient is an 82yo F w PMH of cold agglutinin hemolytic anemia, vasculitis, anxiety, Raynaud's, depression, DVT, glaucoma, s/p hip replacement & partial knee replacement presents to ED for eval of abdominal pain & vomiting.  Pt states she was in her usual state of health until about 2pm yesterday, after lunch of fruits, cottage cheese & yogurt, when she felt mid to upper abd pain, radiating to R flank.  Pt then had multiple episodes of nausea and vomiting.  Pt also c/o fever, chills & weakness. Denies diarrhea, ingestion of contaminated foods.   In ED, patient had US done with moderate hydronephrosis, and CT abdomen with: moderate right hydronephrosis and perinephric stranding secondary to 4 mm proximal right ureteral stone. Pt became hypotensive in ED after 2L of normal saline boluses and T of 101 given tylenol IV.   Pt is being admitted to critical care for further management. Urology is called. (19 Mar 2017 08:03) She notes recurrent pain and fever.      PAST MEDICAL & SURGICAL HISTORY:  Olecranon fracture: left -16  Cold agglutinin disease  Vasculitis: lungs  Varicosities: b/l legs  Anxiety  Depression  Glaucoma: s/p laser last treated   Overactive Bladder  Joint Replacement: right hip with hardware s/p fracture  Raynauds Phenomenon  Colitis: microscopic  diagnosed colonoscopy   DVT (Deep Venous Thrombosis): left leg  last coumadin   Osteopenia  Hemolytic Anemia: diagnosed  transfused 3 years ago  Fracture: right proximal ulna and right radial head  Elbow fracture, right: s/p ORIF  Status post left partial knee replacement:   History of Varicose Veins: s/p stripping and ligation of both legs 40 years ago  S/P Cataract Extraction: right   S/P Colonoscopy: 10/2015 microscopic colitis  S/P Dilatation and Curettage: missed ab x1  S/P Dilatation and Curettage: topa x1  S/P Tooth Extraction: dental implant lower    S/P Carpal Tunnel Release: right   S/P Laminectomy with Spinal Fusion: lumbar 10-  Hip Fracture: right hip s/p orif hardware 2009  S/P Hip Replacement:  - right      REVIEW OF SYSTEMS:    General: Fever	    Respiratory and Thorax: No sob  	  Cardiovascular:	No cp    Gastrointestinal:	 No change in BM    Genitourinary:	No dysuria;  frequency    Musculoskeletal: no bone pain	      MEDICATIONS  (STANDING):    MEDICATIONS  (PRN):      Allergies    Originally Entered as [Unknown] reaction to [Other][cymbalta] (Unknown)  Paxil (Unknown)    Intolerances    sulfa drugs (Stomach Upset)      SOCIAL HISTORY:    FAMILY HISTORY:  No pertinent family history in first degree relatives      Vital Signs Last 24 Hrs  T(C): 39.6, Max: 97.5 ( @ 21:56)  T(F): 103.2, Max: 207.5 ( @ 21:56)  HR: 104 (93 - 104)  BP: 122/91 (80/47 - 126/52)  BP(mean): 98 (50 - 98)  RR: 18 (17 - 25)  SpO2: 82% (64% - 100%)      PHYSICAL EXAM:    Gastrointestinal: Soft; nontender    Genitourinary: No adnexal mass; no prolapse    Extremities: LE mottling of digit      LABS:                        16.1   13.2  )-----------( 330      ( 18 Mar 2017 23:19 )             46.1     18 Mar 2017 23:19    139    |  103    |  18     ----------------------------<  149    5.2     |  27     |  1.20     Ca    8.7        18 Mar 2017 23:19    TPro  7.2    /  Alb  3.4    /  TBili  1.6    /  DBili  x      /  AST  73     /  ALT  40     /  AlkPhos  97     18 Mar 2017 23:19    PT/INR - ( 18 Mar 2017 23:19 )   PT: 12.2 sec;   INR: 1.09 ratio         PTT - ( 18 Mar 2017 23:19 )  PTT:22.7 sec  Urinalysis Basic - ( 19 Mar 2017 06:57 )    Color: Yellow / Appearance: Slightly Turbid / S.010 / pH: x  Gluc: x / Ketone: Negative  / Bili: Negative / Urobili: Negative mg/dL   Blood: x / Protein: 100 mg/dL / Nitrite: Positive   Leuk Esterase: Moderate / RBC: 6-10 /HPF / WBC >50   Sq Epi: x / Non Sq Epi: Few / Bacteria: Many        RADIOLOGY & ADDITIONAL STUDIES:

## 2017-03-19 NOTE — PROVIDER CONTACT NOTE (EICU) - ASSESSMENT
- on zosyn  - follow up blood and urine culture  - urology eval for cystoscopy +/- stent, extraction of stone vs IR percutaneous nephrostomy tube  - on levophed for septic shock to maintain MAP >65  - IVF hydration

## 2017-03-20 VITALS — OXYGEN SATURATION: 100 % | HEART RATE: 110 BPM

## 2017-03-20 DIAGNOSIS — R31.0 GROSS HEMATURIA: ICD-10-CM

## 2017-03-20 LAB
ANION GAP SERPL CALC-SCNC: 28 MMOL/L — HIGH (ref 5–17)
ANISOCYTOSIS BLD QL: SLIGHT — SIGNIFICANT CHANGE UP
APTT BLD: > 200 SEC (ref 27.5–37.4)
BASE EXCESS BLDA CALC-SCNC: -21.2 MMOL/L — LOW (ref -2–2)
BASOPHILS # BLD AUTO: 0.1 K/UL — SIGNIFICANT CHANGE UP (ref 0–0.2)
BASOPHILS NFR BLD AUTO: 0 % — SIGNIFICANT CHANGE UP (ref 0–2)
BIZARRE CELLS [PRESENCE] IN BLOOD BY LIGHT MICROSCOPY: SLIGHT — SIGNIFICANT CHANGE UP
BLOOD GAS COMMENTS: SIGNIFICANT CHANGE UP
BLOOD GAS SOURCE: SIGNIFICANT CHANGE UP
BUN SERPL-MCNC: 28 MG/DL — HIGH (ref 7–23)
BURR CELLS BLD QL SMEAR: PRESENT — SIGNIFICANT CHANGE UP
CALCIUM SERPL-MCNC: 5.7 MG/DL — CRITICAL LOW (ref 8.5–10.1)
CHLORIDE SERPL-SCNC: 105 MMOL/L — SIGNIFICANT CHANGE UP (ref 96–108)
CK SERPL-CCNC: 802 U/L — HIGH (ref 26–192)
CO2 SERPL-SCNC: 16 MMOL/L — LOW (ref 22–31)
CREAT SERPL-MCNC: 3.06 MG/DL — HIGH (ref 0.5–1.3)
ELLIPTOCYTES BLD QL SMEAR: SLIGHT — SIGNIFICANT CHANGE UP
EOSINOPHIL # BLD AUTO: 0.6 K/UL — HIGH (ref 0–0.5)
EOSINOPHIL NFR BLD AUTO: 2 % — SIGNIFICANT CHANGE UP (ref 0–6)
GLUCOSE SERPL-MCNC: 289 MG/DL — HIGH (ref 70–99)
HCO3 BLDA-SCNC: 7 MMOL/L — LOW (ref 21–29)
HCT VFR BLD CALC: 21.1 % — LOW (ref 34.5–45)
HCT VFR BLD CALC: 26.1 % — LOW (ref 34.5–45)
HGB BLD-MCNC: 7.6 G/DL — LOW (ref 11.5–15.5)
HGB BLD-MCNC: 9.2 G/DL — LOW (ref 11.5–15.5)
HOROWITZ INDEX BLDA+IHG-RTO: 60 — SIGNIFICANT CHANGE UP
HYPERCHROMIA BLD QL AUTO: SLIGHT — SIGNIFICANT CHANGE UP
INR BLD: >15 RATIO (ref 0.88–1.16)
LACTATE SERPL-SCNC: 20.1 MMOL/L — CRITICAL HIGH (ref 0.7–2)
LG PLATELETS BLD QL AUTO: SLIGHT — SIGNIFICANT CHANGE UP
LYMPHOCYTES # BLD AUTO: 15 % — SIGNIFICANT CHANGE UP (ref 13–44)
LYMPHOCYTES # BLD AUTO: 2.5 K/UL — SIGNIFICANT CHANGE UP (ref 1–3.3)
MACROCYTES BLD QL: SLIGHT — SIGNIFICANT CHANGE UP
MAGNESIUM SERPL-MCNC: 1.6 MG/DL — LOW (ref 1.8–2.4)
MANUAL DIF COMMENT BLD-IMP: SIGNIFICANT CHANGE UP
MCHC RBC-ENTMCNC: 32 PG — SIGNIFICANT CHANGE UP (ref 27–34)
MCHC RBC-ENTMCNC: 35.1 PG — HIGH (ref 27–34)
MCHC RBC-ENTMCNC: 35.2 GM/DL — SIGNIFICANT CHANGE UP (ref 32–36)
MCHC RBC-ENTMCNC: 38.1 GM/DL — HIGH (ref 32–36)
MCV RBC AUTO: 90.8 FL — SIGNIFICANT CHANGE UP (ref 80–100)
MCV RBC AUTO: 92.1 FL — SIGNIFICANT CHANGE UP (ref 80–100)
METAMYELOCYTES # FLD: 22 % — HIGH (ref 0–0)
MICROCYTES BLD QL: SLIGHT — SIGNIFICANT CHANGE UP
MONOCYTES # BLD AUTO: 1.3 K/UL — HIGH (ref 0–0.9)
MONOCYTES NFR BLD AUTO: 2 % — SIGNIFICANT CHANGE UP (ref 2–14)
MYELOCYTES NFR BLD: 4 % — HIGH (ref 0–0)
NEUTROPHILS # BLD AUTO: 23.7 K/UL — HIGH (ref 1.8–7.4)
NEUTROPHILS NFR BLD AUTO: 34 % — LOW (ref 43–77)
NEUTS BAND # BLD: 21 % — HIGH (ref 0–8)
NRBC # BLD: 6 /100 — HIGH (ref 0–0)
PCO2 BLDA: 24 MMHG — LOW (ref 32–46)
PH BLD: 7.1 — CRITICAL LOW (ref 7.35–7.45)
PHOSPHATE SERPL-MCNC: 7 MG/DL — HIGH (ref 2.5–4.5)
PLAT MORPH BLD: NORMAL — SIGNIFICANT CHANGE UP
PLATELET # BLD AUTO: 71 K/UL — LOW (ref 150–400)
PLATELET # BLD AUTO: 81 K/UL — LOW (ref 150–400)
PLATELET COUNT - ESTIMATE: ABNORMAL
PO2 BLDA: 103 MMHG — SIGNIFICANT CHANGE UP (ref 74–108)
POIKILOCYTOSIS BLD QL AUTO: SLIGHT — SIGNIFICANT CHANGE UP
POTASSIUM SERPL-MCNC: 4.4 MMOL/L — SIGNIFICANT CHANGE UP (ref 3.5–5.3)
POTASSIUM SERPL-SCNC: 4.4 MMOL/L — SIGNIFICANT CHANGE UP (ref 3.5–5.3)
PROTHROM AB SERPL-ACNC: > 150 SEC (ref 10–13.1)
RBC # BLD: 2.3 M/UL — LOW (ref 3.8–5.2)
RBC # BLD: 2.87 M/UL — LOW (ref 3.8–5.2)
RBC # FLD: 16.7 % — HIGH (ref 11–15)
RBC # FLD: 17.1 % — HIGH (ref 11–15)
RBC BLD AUTO: ABNORMAL
SAO2 % BLDA: 96 % — SIGNIFICANT CHANGE UP (ref 92–96)
SMUDGE CELLS # BLD: PRESENT — SIGNIFICANT CHANGE UP
SODIUM SERPL-SCNC: 149 MMOL/L — HIGH (ref 135–145)
SPHEROCYTES BLD QL SMEAR: SLIGHT — SIGNIFICANT CHANGE UP
TARGETS BLD QL SMEAR: SLIGHT — SIGNIFICANT CHANGE UP
TOXIC GRANULES BLD QL SMEAR: PRESENT — SIGNIFICANT CHANGE UP
TROPONIN I SERPL-MCNC: 22.9 NG/ML — HIGH (ref 0.01–0.04)
WBC # BLD: 29.7 K/UL — HIGH (ref 3.8–10.5)
WBC # BLD: 33.5 K/UL — HIGH (ref 3.8–10.5)
WBC # FLD AUTO: 29.7 K/UL — HIGH (ref 3.8–10.5)
WBC # FLD AUTO: 33.5 K/UL — HIGH (ref 3.8–10.5)

## 2017-03-20 PROCEDURE — 85060 BLOOD SMEAR INTERPRETATION: CPT

## 2017-03-20 PROCEDURE — 71010: CPT | Mod: 26

## 2017-03-20 PROCEDURE — 99291 CRITICAL CARE FIRST HOUR: CPT

## 2017-03-20 RX ORDER — MAGNESIUM SULFATE 500 MG/ML
1 VIAL (ML) INJECTION ONCE
Qty: 0 | Refills: 0 | Status: COMPLETED | OUTPATIENT
Start: 2017-03-20 | End: 2017-03-20

## 2017-03-20 RX ORDER — PHENYLEPHRINE HYDROCHLORIDE 10 MG/ML
1 INJECTION INTRAVENOUS
Qty: 160 | Refills: 0 | Status: DISCONTINUED | OUTPATIENT
Start: 2017-03-20 | End: 2017-03-20

## 2017-03-20 RX ORDER — VASOPRESSIN 20 [USP'U]/ML
0.04 INJECTION INTRAVENOUS
Qty: 100 | Refills: 0 | Status: DISCONTINUED | OUTPATIENT
Start: 2017-03-20 | End: 2017-03-20

## 2017-03-20 RX ORDER — CALCIUM GLUCONATE 100 MG/ML
2 VIAL (ML) INTRAVENOUS
Qty: 0 | Refills: 0 | Status: COMPLETED | OUTPATIENT
Start: 2017-03-20 | End: 2017-03-20

## 2017-03-20 RX ORDER — SODIUM BICARBONATE 1 MEQ/ML
50 SYRINGE (ML) INTRAVENOUS
Qty: 0 | Refills: 0 | Status: COMPLETED | OUTPATIENT
Start: 2017-03-20 | End: 2017-03-20

## 2017-03-20 RX ORDER — SODIUM CHLORIDE 9 MG/ML
1000 INJECTION INTRAMUSCULAR; INTRAVENOUS; SUBCUTANEOUS ONCE
Qty: 0 | Refills: 0 | Status: COMPLETED | OUTPATIENT
Start: 2017-03-20 | End: 2017-03-20

## 2017-03-20 RX ADMIN — Medication 100 GRAM(S): at 04:30

## 2017-03-20 RX ADMIN — PIPERACILLIN AND TAZOBACTAM 25 GRAM(S): 4; .5 INJECTION, POWDER, LYOPHILIZED, FOR SOLUTION INTRAVENOUS at 05:13

## 2017-03-20 RX ADMIN — PROPOFOL 3.93 MICROGRAM(S)/KG/MIN: 10 INJECTION, EMULSION INTRAVENOUS at 02:23

## 2017-03-20 RX ADMIN — Medication 200 GRAM(S): at 04:30

## 2017-03-20 RX ADMIN — AMIKACIN SULFATE 101.32 MILLIGRAM(S): 250 INJECTION, SOLUTION INTRAMUSCULAR; INTRAVENOUS at 00:56

## 2017-03-20 RX ADMIN — Medication 0.61 MICROGRAM(S)/KG/MIN: at 08:46

## 2017-03-20 RX ADMIN — Medication 200 GRAM(S): at 05:13

## 2017-03-20 RX ADMIN — Medication 50 MILLIEQUIVALENT(S): at 02:13

## 2017-03-20 RX ADMIN — VASOPRESSIN 2.4 UNIT(S)/MIN: 20 INJECTION INTRAVENOUS at 09:12

## 2017-03-20 RX ADMIN — Medication 150 MEQ/KG/HR: at 02:23

## 2017-03-20 RX ADMIN — SODIUM CHLORIDE 4000 MILLILITER(S): 9 INJECTION INTRAMUSCULAR; INTRAVENOUS; SUBCUTANEOUS at 09:11

## 2017-03-20 RX ADMIN — CHLORHEXIDINE GLUCONATE 15 MILLILITER(S): 213 SOLUTION TOPICAL at 05:13

## 2017-03-20 RX ADMIN — AMIKACIN SULFATE 101.32 MILLIGRAM(S): 250 INJECTION, SOLUTION INTRAMUSCULAR; INTRAVENOUS at 09:01

## 2017-03-20 RX ADMIN — Medication 0.61 MICROGRAM(S)/KG/MIN: at 06:37

## 2017-03-20 RX ADMIN — Medication 50 MILLIEQUIVALENT(S): at 02:23

## 2017-03-20 RX ADMIN — PHENYLEPHRINE HYDROCHLORIDE 12.28 MICROGRAM(S)/KG/MIN: 10 INJECTION INTRAVENOUS at 08:30

## 2017-03-20 NOTE — PROVIDER CONTACT NOTE (CRITICAL VALUE NOTIFICATION) - PERSON GIVING RESULT:
Arg[facility]
Geovanny/partha
Herminia
[facility]
Marley Mckeon
Marley Mckeon-Nita
Marley pinon
Ni
Speedy Serrano
Marley Francisco
Poliantonioo

## 2017-03-20 NOTE — DISCHARGE NOTE FOR THE EXPIRED PATIENT - HOSPITAL COURSE
82 yo female with PMH: cold agglutinin hemolytic anemia, vasculitis, anxiety, DVT, depression, Raynaud's, glaucoma, hip repair and knee replacement admitted with c/o n/v/abdominal pain.  Pt also c/o fevers, chills and weakness.  Pt brought to ER and found to have ureteral calculus causing moderate right sided hydronephrosis.  Pt was in septic shock with mildly elevated cardiac markers and lactate levels in severe metabolic acidosis.   Pt required emergent surgical intervention.  Pt seen by  and sent for cystoscopy for right ureteral stent placement.  Post op, pt's condition continued to decline with lactate 20, hypoglycemia, and troponins >22.  Heparin gtt initiated however pt started to bleed thus it was discontinued.  Additional pressors were added due to BP dropping despite being on levophed.  Pt in multi-system organ failure.  After lengthy discussion with daughter, pt was made DNR.  Pt  at 1106.  Pupils fixed and dilated.  No palpable pulses or response to tactile stimulus.  No heart sounds ausculated.  Family at bedside.

## 2017-03-20 NOTE — CONSULT NOTE ADULT - SUBJECTIVE AND OBJECTIVE BOX
MEDICAL RECORD REVIEWED; HISTORY AND  REVIEW OF SYSTEMS OBTAINED; PATIENT EXAMINED:    81 YEAR OLD  FEMALE WITH NSTEMI COMPLICATING GRAM NEGATIVE SEPSIS/SEPTIC SHOCK; SP URETERAL STENT FOR HYDRONEPHROSIS IN  PATIENT WITH COLD AGGLUTININ HEMOLYTIC ANEMIA, VASCULITIS ; ECG: NSR LOW VOLTAGE  NO ISCHEMIC CHANGES; CHEST XRAY: BILATERAL BASILAR CONGESTIVE CHANGES; ALL LABS/RADIOLOGY/MEDICATIONS REVIEWED; METABOLIC ACIDOSIS/LACTIC ACIDOSIS; RENAL FAILURE, ANEMIA, GRAM NEGATIVE BACTEREMIA; TROPONINS TO 20; ON EXAM: JAUNDICED, DIFFUSE PURPURA AND ECHYMOSSIS, INTUBATED; DILATED PUPILS, NONREACTIVE CORNEA;COARSE BREATH SOUNDS;DISTANT HEART SOUNDS; NURSE REPORTS BLEEDING ABOUT ARTERIAL LINE; MOTTLED SKIN ,WARM    IMPRESSION:    NSTEMI MOST LIKELY COMPLICATING SEPTIC SHOCK WITH MSOD/F; BLEEDING REPORTED WITH HEPARIN NOW DISCONTINUED; POOR PROGNOSIS OVERALL: SPOKE WITH DAUGHTER AT BEDSIDE; FOR ECHO; CONTINUING PRESSORS AND VENTILATORY SUPPORT    LO WALKER MD, FACC

## 2017-03-20 NOTE — PROGRESS NOTE ADULT - SUBJECTIVE AND OBJECTIVE BOX
INTERVAL HPI/OVERNIGHT EVENTS: remains intubated    MEDICATIONS  (STANDING):  piperacillin/tazobactam IVPB. 3.375Gram(s) IV Intermittent every 8 hours  chlorhexidine 4% Liquid 1Application(s) Topical daily  propofol Infusion 10MICROgram(s)/kG/Min IV Continuous <Continuous>  chlorhexidine 0.12% Liquid 15milliLiter(s) Swish and Spit two times a day  pantoprazole  Injectable 40milliGRAM(s) IV Push daily  aspirin  chewable 81milliGRAM(s) Oral daily  sodium bicarbonate  Infusion 0.344mEq/kG/Hr IV Continuous <Continuous>  amiKACIN  IVPB 330milliGRAM(s) IV Intermittent every 8 hours  amiKACIN  IVPB  IV Intermittent   dextrose 10%. 1000milliLiter(s) IV Continuous <Continuous>  norepinephrine Infusion 0.01MICROgram(s)/kG/Min IV Continuous <Continuous>  heparin  Infusion. Unit(s)/Hr IV Continuous <Continuous>    MEDICATIONS  (PRN):  fentaNYL    Injectable 40MICROGram(s) IV Push every 4 hours PRN Moderate Pain (4 - 6)  ondansetron Injectable 4milliGRAM(s) IV Push every 8 hours PRN Nausea and/or Vomiting  heparin  Injectable 4100Unit(s) IV Push every 6 hours PRN For aPTT less than 40      Allergies    Originally Entered as [Unknown] reaction to [Other][cymbalta] (Unknown)  Paxil (Unknown)    Intolerances    sulfa drugs (Stomach Upset)      REVIEW OF SYSTEMS:    General: no fever	    Genitourinary:	hematuria   	      Vital Signs Last 24 Hrs  T(C): 36.3, Max: 39.9 ( @ 07:37)  T(F): 97.4, Max: 103.8 ( @ 07:37)  HR: 111 (93 - 120)  BP: 66/23 (59/35 - 128/77)  BP(mean): 32 (32 - 98)  RR: 28 (13 - 30)  SpO2: 100% (64% - 100%)    PHYSICAL EXAM:      Gastrointestinal: i=soft, nd, nt    Genitourinary: clark draining; urine red      LABS:                        10.5   39.3  )-----------( 112      ( 19 Mar 2017 20:41 )             31.6     20 Mar 2017 03:17    149    |  105    |  28     ----------------------------<  289    4.4     |  16     |  3.06     Ca    5.7        20 Mar 2017 03:17  Phos  7.0       20 Mar 2017 03:17  Mg     1.6       20 Mar 2017 03:17    TPro  7.2    /  Alb  3.4    /  TBili  1.6    /  DBili  x      /  AST  73     /  ALT  40     /  AlkPhos  97     18 Mar 2017 23:19    PT/INR - ( 18 Mar 2017 23:19 )   PT: 12.2 sec;   INR: 1.09 ratio         PTT - ( 18 Mar 2017 23:19 )  PTT:22.7 sec  Urinalysis Basic - ( 19 Mar 2017 06:57 )    Color: Yellow / Appearance: Slightly Turbid / S.010 / pH: x  Gluc: x / Ketone: Negative  / Bili: Negative / Urobili: Negative mg/dL   Blood: x / Protein: 100 mg/dL / Nitrite: Positive   Leuk Esterase: Moderate / RBC: 6-10 /HPF / WBC >50   Sq Epi: x / Non Sq Epi: Few / Bacteria: Many        RADIOLOGY & ADDITIONAL TESTS:

## 2017-03-20 NOTE — PROVIDER CONTACT NOTE (CRITICAL VALUE NOTIFICATION) - TEST AND RESULT REPORTED:
CO2 8, Glucose less than 1
lactate 12.1
+ blood cultures growth in anaerobic bottle gram negative rods
Blood cultures gram negative rods in both anaerobic and aerobic bottles
CO2 7 Calcium 6.0
Lactate 15.7
Lactate 20.1, Ca+ 6.7, Trop 22.9
Troponin - 0.558
Troponin 18.30
lactate 5.8
pt >150  INR >15  Ptt >200
wbc 41.2

## 2017-03-20 NOTE — PROGRESS NOTE ADULT - SUBJECTIVE AND OBJECTIVE BOX
HPI:  Pt is an 82 yo WF with h/o cold agglutinin hemolytic anemia, vasculitis involving lungs, Raynaud’s, L leg DVT 2001 (0ff Coumadin since 2009), glaucoma, s/p left hip replacement in 2000 and left partial knee replacement in 2012 , osteopenia and anxiety presents for abdominal pain, N/V,  fevers, chills with generalized weakness. Labs with leukocytosis, (+) UA, elevated lactate 5.8 and trop 0.32->0.56. CT abdomen showing moderate R hydronephrosis with perinephric stranding and a proximal R ureteral stone. Despite being Rx'd with IVF and Abx; pt started on Levophed. Admitting dx: Septic shock 2 to hydronephrosis 2 to urinary obstruction 2  to ureteral calculus. s/p cystoscopy and R ureteral stent placement    24 hr events: Overnight pt developed worsening septic shock with multi-organ failure    ## Labs:  CBC:                        9.2    33.5  )-----------( 82       ( 20 Mar 2017 11:07 )             26.1     Chem:  20 Mar 2017 03:17    149    |  105    |  28     ----------------------------<  289    4.4     |  16     |  3.06     Ca    5.7        20 Mar 2017 03:17  Phos  7.0       20 Mar 2017 03:17  Mg     1.6       20 Mar 2017 03:17    TPro  7.2    /  Alb  3.4    /  TBili  1.6    /  DBili  x      /  AST  73     /  ALT  40     /  AlkPhos  97     18 Mar 2017 23:19    Coags:  PT/INR - ( 20 Mar 2017 08:43 )   PT: > 150 sec;   INR: >15 ratio         PTT - ( 20 Mar 2017 08:43 )  PTT:> 200 sec    ## Imaging:    ## Medications:  piperacillin/tazobactam IVPB. 3.375Gram(s) IV Intermittent every 8 hours  amiKACIN  IVPB 330milliGRAM(s) IV Intermittent every 8 hours  amiKACIN  IVPB  IV Intermittent     norepinephrine Infusion 0.01MICROgram(s)/kG/Min IV Continuous <Continuous>  phenylephrine    Infusion 1MICROgram(s)/kG/Min IV Continuous <Continuous>      vasopressin Infusion 0.04Unit(s)/Min IV Continuous <Continuous>    aspirin  chewable 81milliGRAM(s) Oral daily  heparin  Infusion. Unit(s)/Hr IV Continuous <Continuous>  heparin  Injectable 4100Unit(s) IV Push every 6 hours PRN    pantoprazole  Injectable 40milliGRAM(s) IV Push daily    propofol Infusion 10MICROgram(s)/kG/Min IV Continuous <Continuous>  fentaNYL    Injectable 40MICROGram(s) IV Push every 4 hours PRN  ondansetron Injectable 4milliGRAM(s) IV Push every 8 hours PRN      ## Vitals:  T(C): 35.5, Max: 36.5 (03-19 @ 23:39)  HR: 110 (0 - 120)  BP: 66/23 (59/35 - 128/77)  BP(mean): 32 (32 - 89)  RR: 28 (13 - 30)  SpO2: 100% (88% - 100%)  Wt(kg): --  Vent: Mode: AC/ CMV (Assist Control/ Continuous Mandatory Ventilation), RR (machine): 28, RR (patient): 30, TV (machine): 450, FiO2: 60, PEEP: 5, PIP: 28  ABG: ABG - ( 20 Mar 2017 01:52 )  pH: x     /  pCO2: 24    /  pO2: 103   / HCO3: 7     / Base Excess: -21.2 /  SaO2: 96          I & Os for 24h ending 03-20 @ 07:00  =============================================  IN: 6695.9 ml / OUT: 680 ml / NET: 6015.9 ml    I & Os for current day (as of 03-20 @ 14:18)  =============================================  IN: 2399.4 ml / OUT: 65 ml / NET: 2334.4 ml    ## P/E:  Gen: lying comfortably in bed in no apparent distress  Mouth: ETT  Lungs: CTA  Heart: Tachy  Abd: Soft/+BS  Ext: Generalized cyanosis hands and feet improve but some fingers cyanotic/ LE mottled  Neuro: Pupils fixed/ No corneal, gag or cough reflexes/ pt with (+) spont resp      CENTRAL LINE: [x ] YES [ ] NO  LOCATION: R IJ TLC  DATE INSERTED: 3/19  REMOVE: [ ] YES [ ] NO      AQUINO: [x ] YES [ ] NO    DATE INSERTED:  REMOVE:  [ ] YES [ ] NO      A-LINE:  [x ] YES [ ] NO  LOCATION:   DATE INSERTED:  REMOVE:  [ ] YES [ ] NO  EXPLAIN:    CODE STATUS: [ ] full code  [x ] DNR  [ ] DNI  [ ] MOLST  Goals of care discussion: [x ] yes Given pt's current condition, septic shock with MOF and neurologic event and her extremely poor prognosis, pt's daughter said her mother would not want to be maintained in this condition. I said we would cont to treat her mother but in the event of a cardiac arrest we would let her go "peacefully" and daughter agreed for DNR status. HPI:  Pt is an 82 yo WF with h/o cold agglutinin hemolytic anemia, vasculitis involving lungs, Raynaud’s, L leg DVT 2001 (0ff Coumadin since 2009), glaucoma, s/p left hip replacement in 2000 and left partial knee replacement in 2012 , osteopenia and anxiety presents for abdominal pain, N/V,  fevers, chills with generalized weakness. Labs with leukocytosis, (+) UA, elevated lactate 5.8 and trop 0.32->0.56. CT abdomen showing moderate R hydronephrosis with perinephric stranding and a proximal R ureteral stone. Despite being Rx'd with IVF and Abx; pt started on Levophed. Admitting dx: Septic shock 2 to hydronephrosis 2 to urinary obstruction 2  to ureteral calculus. s/p cystoscopy and R ureteral stent placement    24 hr events: Overnight pt developed worsening septic shock with multi-organ failure    ## Labs:  CBC:                        9.2    33.5  )-----------( 82       ( 20 Mar 2017 11:07 )             26.1     Chem:  20 Mar 2017 03:17    149    |  105    |  28     ----------------------------<  289    4.4     |  16     |  3.06     Ca    5.7        20 Mar 2017 03:17  Phos  7.0       20 Mar 2017 03:17  Mg     1.6       20 Mar 2017 03:17    TPro  7.2    /  Alb  3.4    /  TBili  1.6    /  DBili  x      /  AST  73     /  ALT  40     /  AlkPhos  97     18 Mar 2017 23:19    Coags:  PT/INR - ( 20 Mar 2017 08:43 )   PT: > 150 sec;   INR: >15 ratio         PTT - ( 20 Mar 2017 08:43 )  PTT:> 200 sec    ## Imaging: CXR: ETT/ NGT/ R IJ TLC/ b/l infiltrate    ## Medications:  piperacillin/tazobactam IVPB. 3.375Gram(s) IV Intermittent every 8 hours  amiKACIN  IVPB 330milliGRAM(s) IV Intermittent every 8 hours  amiKACIN  IVPB  IV Intermittent     norepinephrine Infusion 0.01MICROgram(s)/kG/Min IV Continuous <Continuous>  phenylephrine    Infusion 1MICROgram(s)/kG/Min IV Continuous <Continuous>      vasopressin Infusion 0.04Unit(s)/Min IV Continuous <Continuous>    aspirin  chewable 81milliGRAM(s) Oral daily  heparin  Infusion. Unit(s)/Hr IV Continuous <Continuous>  heparin  Injectable 4100Unit(s) IV Push every 6 hours PRN    pantoprazole  Injectable 40milliGRAM(s) IV Push daily    propofol Infusion 10MICROgram(s)/kG/Min IV Continuous <Continuous>  fentaNYL    Injectable 40MICROGram(s) IV Push every 4 hours PRN  ondansetron Injectable 4milliGRAM(s) IV Push every 8 hours PRN      ## Vitals:  T(C): 35.5, Max: 36.5 (03-19 @ 23:39)  HR: 110 (0 - 120)  BP: 66/23 (59/35 - 128/77)  BP(mean): 32 (32 - 89)  RR: 28 (13 - 30)  SpO2: 100% (88% - 100%)  Wt(kg): --  Vent: Mode: AC/ CMV (Assist Control/ Continuous Mandatory Ventilation), RR (machine): 28, RR (patient): 30, TV (machine): 450, FiO2: 60, PEEP: 5, PIP: 28  ABG: ABG - ( 20 Mar 2017 01:52 )  pH: x     /  pCO2: 24    /  pO2: 103   / HCO3: 7     / Base Excess: -21.2 /  SaO2: 96          I & Os for 24h ending 03-20 @ 07:00  =============================================  IN: 6695.9 ml / OUT: 680 ml / NET: 6015.9 ml    I & Os for current day (as of 03-20 @ 14:18)  =============================================  IN: 2399.4 ml / OUT: 65 ml / NET: 2334.4 ml    ## P/E:  Gen: lying comfortably in bed in no apparent distress  Mouth: ETT  Lungs: CTA  Heart: Tachy  Abd: Soft/+BS  Ext: Generalized cyanosis hands and feet improve but some fingers cyanotic/ LE mottled  Neuro: Pupils fixed/ No corneal, gag or cough reflexes/ pt with (+) spont resp      CENTRAL LINE: [x ] YES [ ] NO  LOCATION: R IJ TLC  DATE INSERTED: 3/19  REMOVE: [ ] YES [ ] NO      AQUINO: [x ] YES [ ] NO    DATE INSERTED:  REMOVE:  [ ] YES [ ] NO      A-LINE:  [x ] YES [ ] NO  LOCATION:   DATE INSERTED:  REMOVE:  [ ] YES [ ] NO  EXPLAIN:    CODE STATUS: [ ] full code  [x ] DNR  [ ] DNI  [ ] MOLST  Goals of care discussion: [x ] yes Given pt's current condition, septic shock with MOF and neurologic event and her extremely poor prognosis, pt's daughter said her mother would not want to be maintained in this condition. I said we would cont to treat her mother but in the event of a cardiac arrest we would let her go "peacefully" and daughter agreed for DNR status.

## 2017-03-20 NOTE — PROGRESS NOTE ADULT - ASSESSMENT
Pt is an 82 yo WF with h/o cold agglutinin hemolytic anemia, vasculitis involving lungs, Raynaud’s, L leg DVT 2001 (0ff Coumadin since 2009), glaucoma, s/p left hip replacement in 2000 and left partial knee replacement in 2012 , osteopenia and anxiety presents for abdominal pain, N/V,  fevers, chills with generalized weakness. Labs with leukocytosis, (+) UA, elevated lactate 5.8 and trop 0.32->0.56. CT abdomen showing moderate R hydronephrosis with perinephric stranding and a proximal R ureteral stone. Despite being Rx'd with IVF and Abx; pt started on Levophed. Admitting dx: Septic shock 2 to hydronephrosis 2 to urinary obstruction 2  to ureteral calculus. UCx (+) Proteus and BldCx: GNR.  Overnight pt developed worsening septic shock with multi-organ failure (neurological event, worsening shock, NSTEMI, JOE and DIC)    Resp: Cont current vent settings  ID: Cont Zosyn and Amikacin  CVS: Titrate Levophed + Neosynephrine to MAP > 60-65/ Worsening lactic acidosis  HEME: Coags + Platelets c/w DIC  FEN: Cont Bicarb drip  Renal: F/u as per Uro/ Follow BUN/Cr and UO  Neuro: pt with a neurologic event based on neur exam but too unstable for CT head  Social: Given pt's current condition, septic shock with MOF and neurologic event and her extremely poor prognosis, pt's daughter said her mother would not want to be maintained in this condition. I said we would cont to treat her mother but in the event of a cardiac arrest we would let her go "peacefully" and daughter agreed for DNR status    CCT 45 min. Pt is an 80 yo WF with h/o cold agglutinin hemolytic anemia, vasculitis involving lungs, Raynaud’s, L leg DVT 2001 (0ff Coumadin since 2009), glaucoma, s/p left hip replacement in 2000 and left partial knee replacement in 2012 , osteopenia and anxiety presents for abdominal pain, N/V,  fevers, chills with generalized weakness. Labs with leukocytosis, (+) UA, elevated lactate 5.8 and trop 0.32->0.56. CT abdomen showing moderate R hydronephrosis with perinephric stranding and a proximal R ureteral stone. Despite being Rx'd with IVF and Abx; pt started on Levophed. Admitting dx: Septic shock 2 to hydronephrosis 2 to urinary obstruction 2  to ureteral calculus. UCx (+) Proteus and BldCx: GNR.  Overnight pt developed worsening septic shock with multi-organ failure (neurological event, worsening shock, NSTEMI, JOE and DIC)    Resp: Cont current vent settings  ID: Cont Zosyn and Amikacin  CVS: Titrate Levophed + Neosynephrine to MAP > 60-65/ Worsening lactic acidosis  HEME: Coags + Platelets c/w DIC  FEN: Cont Bicarb drip  Renal: F/u as per Uro/ Follow BUN/Cr and UO  Neuro: Pt with a neurologic event based on neuro exam but too unstable for CT head  Social: Given pt's current condition, septic shock with MOF and neurologic event and her extremely poor prognosis, pt's daughter said her mother would not want to be maintained in this condition. I said we would cont to treat her mother but in the event of a cardiac arrest we would let her go "peacefully" and daughter agreed for DNR status    CCT 45 min.

## 2017-03-21 LAB
-  AMIKACIN: SIGNIFICANT CHANGE UP
-  AMPICILLIN/SULBACTAM: SIGNIFICANT CHANGE UP
-  AMPICILLIN: SIGNIFICANT CHANGE UP
-  AZTREONAM: SIGNIFICANT CHANGE UP
-  CEFAZOLIN: SIGNIFICANT CHANGE UP
-  CEFEPIME: SIGNIFICANT CHANGE UP
-  CEFOXITIN: SIGNIFICANT CHANGE UP
-  CEFTAZIDIME: SIGNIFICANT CHANGE UP
-  CEFTRIAXONE: SIGNIFICANT CHANGE UP
-  CIPROFLOXACIN: SIGNIFICANT CHANGE UP
-  ERTAPENEM: SIGNIFICANT CHANGE UP
-  GENTAMICIN: SIGNIFICANT CHANGE UP
-  LEVOFLOXACIN: SIGNIFICANT CHANGE UP
-  MEROPENEM: SIGNIFICANT CHANGE UP
-  NITROFURANTOIN: SIGNIFICANT CHANGE UP
-  NITROFURANTOIN: SIGNIFICANT CHANGE UP
-  PIPERACILLIN/TAZOBACTAM: SIGNIFICANT CHANGE UP
-  TOBRAMYCIN: SIGNIFICANT CHANGE UP
-  TRIMETHOPRIM/SULFAMETHOXAZOLE: SIGNIFICANT CHANGE UP
CULTURE RESULTS: SIGNIFICANT CHANGE UP
GRAM STN FLD: SIGNIFICANT CHANGE UP
GRAM STN FLD: SIGNIFICANT CHANGE UP
METHOD TYPE: SIGNIFICANT CHANGE UP
ORGANISM # SPEC MICROSCOPIC CNT: SIGNIFICANT CHANGE UP
SPECIMEN SOURCE: SIGNIFICANT CHANGE UP

## 2017-03-24 DIAGNOSIS — Z96.652 PRESENCE OF LEFT ARTIFICIAL KNEE JOINT: ICD-10-CM

## 2017-03-24 DIAGNOSIS — I83.93 ASYMPTOMATIC VARICOSE VEINS OF BILATERAL LOWER EXTREMITIES: ICD-10-CM

## 2017-03-24 DIAGNOSIS — D64.9 ANEMIA, UNSPECIFIED: ICD-10-CM

## 2017-03-24 DIAGNOSIS — I73.00 RAYNAUD'S SYNDROME WITHOUT GANGRENE: ICD-10-CM

## 2017-03-24 DIAGNOSIS — M85.80 OTHER SPECIFIED DISORDERS OF BONE DENSITY AND STRUCTURE, UNSPECIFIED SITE: ICD-10-CM

## 2017-03-24 DIAGNOSIS — Z86.718 PERSONAL HISTORY OF OTHER VENOUS THROMBOSIS AND EMBOLISM: ICD-10-CM

## 2017-03-24 DIAGNOSIS — Z79.52 LONG TERM (CURRENT) USE OF SYSTEMIC STEROIDS: ICD-10-CM

## 2017-03-24 DIAGNOSIS — Z96.641 PRESENCE OF RIGHT ARTIFICIAL HIP JOINT: ICD-10-CM

## 2017-03-24 DIAGNOSIS — I24.8 OTHER FORMS OF ACUTE ISCHEMIC HEART DISEASE: ICD-10-CM

## 2017-03-24 DIAGNOSIS — Z88.2 ALLERGY STATUS TO SULFONAMIDES: ICD-10-CM

## 2017-03-24 DIAGNOSIS — R65.21 SEVERE SEPSIS WITH SEPTIC SHOCK: ICD-10-CM

## 2017-03-24 DIAGNOSIS — F32.9 MAJOR DEPRESSIVE DISORDER, SINGLE EPISODE, UNSPECIFIED: ICD-10-CM

## 2017-03-24 DIAGNOSIS — N19 UNSPECIFIED KIDNEY FAILURE: ICD-10-CM

## 2017-03-24 DIAGNOSIS — E87.2 ACIDOSIS: ICD-10-CM

## 2017-03-24 DIAGNOSIS — I21.4 NON-ST ELEVATION (NSTEMI) MYOCARDIAL INFARCTION: ICD-10-CM

## 2017-03-24 DIAGNOSIS — A41.50 GRAM-NEGATIVE SEPSIS, UNSPECIFIED: ICD-10-CM

## 2017-03-24 DIAGNOSIS — A41.9 SEPSIS, UNSPECIFIED ORGANISM: ICD-10-CM

## 2017-03-24 DIAGNOSIS — F41.9 ANXIETY DISORDER, UNSPECIFIED: ICD-10-CM

## 2017-03-24 DIAGNOSIS — Z79.82 LONG TERM (CURRENT) USE OF ASPIRIN: ICD-10-CM

## 2017-03-24 DIAGNOSIS — N13.2 HYDRONEPHROSIS WITH RENAL AND URETERAL CALCULOUS OBSTRUCTION: ICD-10-CM

## 2017-03-24 DIAGNOSIS — H40.9 UNSPECIFIED GLAUCOMA: ICD-10-CM

## 2017-03-24 DIAGNOSIS — E16.2 HYPOGLYCEMIA, UNSPECIFIED: ICD-10-CM

## 2017-03-24 DIAGNOSIS — Z98.49 CATARACT EXTRACTION STATUS, UNSPECIFIED EYE: ICD-10-CM

## 2017-03-24 DIAGNOSIS — Z79.891 LONG TERM (CURRENT) USE OF OPIATE ANALGESIC: ICD-10-CM

## 2017-03-24 DIAGNOSIS — R31.0 GROSS HEMATURIA: ICD-10-CM

## 2017-03-24 DIAGNOSIS — Z88.8 ALLERGY STATUS TO OTHER DRUGS, MEDICAMENTS AND BIOLOGICAL SUBSTANCES STATUS: ICD-10-CM

## 2022-06-18 NOTE — PATIENT PROFILE ADULT. - FALL HARM RISK CONCLUSION
[Anxiety] : anxiety [Negative] : Allergic/Immunologic [Fever] : no fever [Fatigue] : no fatigue [Recent Change In Weight] : ~T no recent weight change [Eye Pain] : no eye pain [Vision Problems] : no vision problems [Dysphagia] : no dysphagia [Hoarseness] : no hoarseness [Mucosal Pain] : no mucosal pain [Chest Pain] : no chest pain [Lower Ext Edema] : no lower extremity edema [Cough] : no cough [SOB on Exertion] : no shortness of breath during exertion [Abdominal Pain] : no abdominal pain [Constipation] : no constipation [Diarrhea] : no diarrhea [Dysuria] : no dysuria [Joint Pain] : no joint pain [Muscle Pain] : no muscle pain [Skin Rash] : no skin rash [Dizziness] : no dizziness [Fainting] : no fainting [Insomnia] : no insomnia [Depression] : no depression [Hot Flashes] : no hot flashes [Muscle Weakness] : no muscle weakness [Easy Bleeding] : no tendency for easy bleeding [Easy Bruising] : no tendency for easy bruising [de-identified] : work related Fall with Harm Risk

## 2024-11-04 NOTE — PROVIDER CONTACT NOTE (CRITICAL VALUE NOTIFICATION) - NAME OF MD/NP/PA/DO NOTIFIED:
NITESH Dotson
NITESH Dotson
Quiana Dotson
VENUS Hurtado
VENUS Roberson
VENUS Scherer
VENUS العلي
VENUS العلي
YASMANY Dotson made aware
dr Wilson
icu NP ADEL PAGED BY UNIT SECRETARY
VENUS Hurtado
DISPLAY PLAN FREE TEXT

## 2025-02-18 NOTE — ED ADULT NURSE NOTE - OTHER COMPLAINTS
[FreeTextEntry1] :    I, Dr. Emmett Sierra, personally performed the evaluation and management services for this patient who presents  today as a new consultative visit. I personally performed the services described in the documentation, reviewed the documentation recorded by the scribe in my presence and it accurately and completely records my words and actions     Ms. Keyla Bahena acted as a scribe in writing the note that was dictated by me.           I spent  a total of   60 minutes evaluating the patient today. This includes spending 10 min on reviewing the patient's serial performed investigations, discussing today's PFTs, the causes of symptoms, the need to get further investigations, shared decision making regarding follow up.     The remaining time was spent with the patient in discussing health maintenance, benefits of an active life style and avoidance of inhalational exposure     This time does not include performance of any procedures such as PFTs dizziness, vomiting